# Patient Record
Sex: FEMALE | Race: WHITE | NOT HISPANIC OR LATINO | Employment: FULL TIME | URBAN - METROPOLITAN AREA
[De-identification: names, ages, dates, MRNs, and addresses within clinical notes are randomized per-mention and may not be internally consistent; named-entity substitution may affect disease eponyms.]

---

## 2023-09-11 ENCOUNTER — HOSPITAL ENCOUNTER (EMERGENCY)
Facility: CLINIC | Age: 33
Discharge: HOME | End: 2023-09-11
Payer: COMMERCIAL

## 2023-09-11 ENCOUNTER — HOSPITAL ENCOUNTER (OUTPATIENT)
Facility: CLINIC | Age: 33
Setting detail: OBSERVATION
Discharge: HOME | End: 2023-09-13
Attending: EMERGENCY MEDICINE | Admitting: RADIOLOGY
Payer: COMMERCIAL

## 2023-09-11 DIAGNOSIS — Z86.69 HISTORY OF DIPLOPIA: ICD-10-CM

## 2023-09-11 DIAGNOSIS — R51.9 ACUTE INTRACTABLE HEADACHE, UNSPECIFIED HEADACHE TYPE: ICD-10-CM

## 2023-09-11 DIAGNOSIS — G93.2 INTRACRANIAL HYPERTENSION: ICD-10-CM

## 2023-09-11 DIAGNOSIS — F32.A DEPRESSION, UNSPECIFIED DEPRESSION TYPE: Primary | ICD-10-CM

## 2023-09-11 PROCEDURE — 99285 EMERGENCY DEPT VISIT HI MDM: CPT | Mod: 25

## 2023-09-11 PROCEDURE — 96375 TX/PRO/DX INJ NEW DRUG ADDON: CPT

## 2023-09-11 PROCEDURE — 250N000011 HC RX IP 250 OP 636: Performed by: EMERGENCY MEDICINE

## 2023-09-11 PROCEDURE — 96374 THER/PROPH/DIAG INJ IV PUSH: CPT

## 2023-09-11 RX ORDER — DEXAMETHASONE SODIUM PHOSPHATE 10 MG/ML
10 INJECTION, SOLUTION INTRAMUSCULAR; INTRAVENOUS ONCE
Status: COMPLETED | OUTPATIENT
Start: 2023-09-11 | End: 2023-09-11

## 2023-09-11 RX ORDER — LORAZEPAM 2 MG/ML
0.5 INJECTION INTRAMUSCULAR ONCE
Status: COMPLETED | OUTPATIENT
Start: 2023-09-11 | End: 2023-09-12

## 2023-09-11 RX ORDER — HYDROMORPHONE HYDROCHLORIDE 1 MG/ML
0.5 INJECTION, SOLUTION INTRAMUSCULAR; INTRAVENOUS; SUBCUTANEOUS ONCE
Status: COMPLETED | OUTPATIENT
Start: 2023-09-11 | End: 2023-09-11

## 2023-09-11 RX ADMIN — DEXAMETHASONE SODIUM PHOSPHATE 10 MG: 10 INJECTION, SOLUTION INTRAMUSCULAR; INTRAVENOUS at 22:05

## 2023-09-11 RX ADMIN — HYDROMORPHONE HYDROCHLORIDE 0.5 MG: 1 INJECTION, SOLUTION INTRAMUSCULAR; INTRAVENOUS; SUBCUTANEOUS at 21:57

## 2023-09-11 RX ADMIN — MIDAZOLAM 2 MG: 1 INJECTION INTRAMUSCULAR; INTRAVENOUS at 23:44

## 2023-09-11 ASSESSMENT — ACTIVITIES OF DAILY LIVING (ADL)
ADLS_ACUITY_SCORE: 35
ADLS_ACUITY_SCORE: 35

## 2023-09-12 ENCOUNTER — APPOINTMENT (OUTPATIENT)
Dept: MRI IMAGING | Facility: CLINIC | Age: 33
End: 2023-09-12
Attending: EMERGENCY MEDICINE
Payer: COMMERCIAL

## 2023-09-12 PROBLEM — G93.2 INTRACRANIAL HYPERTENSION: Status: ACTIVE | Noted: 2023-09-12

## 2023-09-12 PROBLEM — Z86.69 HISTORY OF DIPLOPIA: Status: ACTIVE | Noted: 2023-09-12

## 2023-09-12 PROBLEM — R51.9 ACUTE INTRACTABLE HEADACHE, UNSPECIFIED HEADACHE TYPE: Status: ACTIVE | Noted: 2023-09-12

## 2023-09-12 PROCEDURE — 99222 1ST HOSP IP/OBS MODERATE 55: CPT | Performed by: INTERNAL MEDICINE

## 2023-09-12 PROCEDURE — 250N000011 HC RX IP 250 OP 636: Performed by: EMERGENCY MEDICINE

## 2023-09-12 PROCEDURE — 96376 TX/PRO/DX INJ SAME DRUG ADON: CPT | Mod: XU

## 2023-09-12 PROCEDURE — 96375 TX/PRO/DX INJ NEW DRUG ADDON: CPT

## 2023-09-12 PROCEDURE — 250N000013 HC RX MED GY IP 250 OP 250 PS 637: Performed by: INTERNAL MEDICINE

## 2023-09-12 PROCEDURE — 999N000128 HC STATISTIC PERIPHERAL IV START W/O US GUIDANCE

## 2023-09-12 PROCEDURE — 258N000001 HC RX 258: Performed by: HOSPITALIST

## 2023-09-12 PROCEDURE — 70546 MR ANGIOGRAPH HEAD W/O&W/DYE: CPT

## 2023-09-12 PROCEDURE — 96376 TX/PRO/DX INJ SAME DRUG ADON: CPT

## 2023-09-12 PROCEDURE — G0378 HOSPITAL OBSERVATION PER HR: HCPCS

## 2023-09-12 PROCEDURE — 258N000003 HC RX IP 258 OP 636: Performed by: INTERNAL MEDICINE

## 2023-09-12 PROCEDURE — 250N000011 HC RX IP 250 OP 636: Performed by: INTERNAL MEDICINE

## 2023-09-12 PROCEDURE — 250N000011 HC RX IP 250 OP 636: Performed by: HOSPITALIST

## 2023-09-12 PROCEDURE — 70553 MRI BRAIN STEM W/O & W/DYE: CPT

## 2023-09-12 PROCEDURE — 255N000002 HC RX 255 OP 636: Performed by: EMERGENCY MEDICINE

## 2023-09-12 PROCEDURE — 250N000013 HC RX MED GY IP 250 OP 250 PS 637: Performed by: HOSPITALIST

## 2023-09-12 PROCEDURE — A9585 GADOBUTROL INJECTION: HCPCS | Performed by: EMERGENCY MEDICINE

## 2023-09-12 PROCEDURE — 99207 PR APP CREDIT; MD BILLING SHARED VISIT: CPT | Performed by: HOSPITALIST

## 2023-09-12 RX ORDER — SODIUM CHLORIDE 9 MG/ML
INJECTION, SOLUTION INTRAVENOUS CONTINUOUS
Status: DISCONTINUED | OUTPATIENT
Start: 2023-09-12 | End: 2023-09-12

## 2023-09-12 RX ORDER — NALOXONE HYDROCHLORIDE 0.4 MG/ML
0.4 INJECTION, SOLUTION INTRAMUSCULAR; INTRAVENOUS; SUBCUTANEOUS
Status: DISCONTINUED | OUTPATIENT
Start: 2023-09-12 | End: 2023-09-13 | Stop reason: HOSPADM

## 2023-09-12 RX ORDER — PROCHLORPERAZINE 25 MG
25 SUPPOSITORY, RECTAL RECTAL EVERY 12 HOURS PRN
Status: DISCONTINUED | OUTPATIENT
Start: 2023-09-12 | End: 2023-09-13 | Stop reason: HOSPADM

## 2023-09-12 RX ORDER — KETOROLAC TROMETHAMINE 15 MG/ML
30 INJECTION, SOLUTION INTRAMUSCULAR; INTRAVENOUS EVERY 6 HOURS PRN
Status: DISCONTINUED | OUTPATIENT
Start: 2023-09-12 | End: 2023-09-13 | Stop reason: HOSPADM

## 2023-09-12 RX ORDER — ACETAMINOPHEN 325 MG/1
650 TABLET ORAL EVERY 6 HOURS PRN
Status: DISCONTINUED | OUTPATIENT
Start: 2023-09-12 | End: 2023-09-13 | Stop reason: HOSPADM

## 2023-09-12 RX ORDER — NALOXONE HYDROCHLORIDE 0.4 MG/ML
0.2 INJECTION, SOLUTION INTRAMUSCULAR; INTRAVENOUS; SUBCUTANEOUS
Status: DISCONTINUED | OUTPATIENT
Start: 2023-09-12 | End: 2023-09-13 | Stop reason: HOSPADM

## 2023-09-12 RX ORDER — IBUPROFEN 200 MG
200 TABLET ORAL EVERY 4 HOURS PRN
COMMUNITY

## 2023-09-12 RX ORDER — AMOXICILLIN 250 MG
2 CAPSULE ORAL 2 TIMES DAILY PRN
Status: DISCONTINUED | OUTPATIENT
Start: 2023-09-12 | End: 2023-09-13 | Stop reason: HOSPADM

## 2023-09-12 RX ORDER — PROCHLORPERAZINE MALEATE 10 MG
10 TABLET ORAL EVERY 6 HOURS PRN
Status: DISCONTINUED | OUTPATIENT
Start: 2023-09-12 | End: 2023-09-13 | Stop reason: HOSPADM

## 2023-09-12 RX ORDER — ONDANSETRON 4 MG/1
4 TABLET, ORALLY DISINTEGRATING ORAL EVERY 6 HOURS PRN
Status: DISCONTINUED | OUTPATIENT
Start: 2023-09-12 | End: 2023-09-13 | Stop reason: HOSPADM

## 2023-09-12 RX ORDER — DIAZEPAM 10 MG/2ML
5 INJECTION, SOLUTION INTRAMUSCULAR; INTRAVENOUS EVERY 6 HOURS PRN
Status: DISCONTINUED | OUTPATIENT
Start: 2023-09-12 | End: 2023-09-13 | Stop reason: HOSPADM

## 2023-09-12 RX ORDER — KETOROLAC TROMETHAMINE 15 MG/ML
30 INJECTION, SOLUTION INTRAMUSCULAR; INTRAVENOUS ONCE
Status: COMPLETED | OUTPATIENT
Start: 2023-09-12 | End: 2023-09-12

## 2023-09-12 RX ORDER — HYDROMORPHONE HYDROCHLORIDE 1 MG/ML
0.5 INJECTION, SOLUTION INTRAMUSCULAR; INTRAVENOUS; SUBCUTANEOUS
Status: DISCONTINUED | OUTPATIENT
Start: 2023-09-12 | End: 2023-09-13 | Stop reason: HOSPADM

## 2023-09-12 RX ORDER — GADOBUTROL 604.72 MG/ML
10 INJECTION INTRAVENOUS ONCE
Status: COMPLETED | OUTPATIENT
Start: 2023-09-12 | End: 2023-09-12

## 2023-09-12 RX ORDER — LORAZEPAM 2 MG/ML
0.5 INJECTION INTRAMUSCULAR ONCE
Status: COMPLETED | OUTPATIENT
Start: 2023-09-12 | End: 2023-09-12

## 2023-09-12 RX ORDER — OXYCODONE HYDROCHLORIDE 5 MG/1
5 TABLET ORAL EVERY 4 HOURS PRN
Status: DISCONTINUED | OUTPATIENT
Start: 2023-09-12 | End: 2023-09-13 | Stop reason: HOSPADM

## 2023-09-12 RX ORDER — ONDANSETRON 2 MG/ML
4 INJECTION INTRAMUSCULAR; INTRAVENOUS ONCE
Status: DISCONTINUED | OUTPATIENT
Start: 2023-09-12 | End: 2023-09-13 | Stop reason: HOSPADM

## 2023-09-12 RX ORDER — LIDOCAINE 40 MG/G
CREAM TOPICAL
Status: DISCONTINUED | OUTPATIENT
Start: 2023-09-12 | End: 2023-09-13 | Stop reason: HOSPADM

## 2023-09-12 RX ORDER — FLUOXETINE 10 MG/1
10 CAPSULE ORAL DAILY
Status: DISCONTINUED | OUTPATIENT
Start: 2023-09-12 | End: 2023-09-13 | Stop reason: HOSPADM

## 2023-09-12 RX ORDER — ACETAMINOPHEN 650 MG/1
650 SUPPOSITORY RECTAL EVERY 6 HOURS PRN
Status: DISCONTINUED | OUTPATIENT
Start: 2023-09-12 | End: 2023-09-13 | Stop reason: HOSPADM

## 2023-09-12 RX ORDER — ELETRIPTAN HYDROBROMIDE 20 MG/1
20 TABLET, FILM COATED ORAL
Status: COMPLETED | OUTPATIENT
Start: 2023-09-12 | End: 2023-09-12

## 2023-09-12 RX ORDER — ONDANSETRON 2 MG/ML
4 INJECTION INTRAMUSCULAR; INTRAVENOUS EVERY 6 HOURS PRN
Status: DISCONTINUED | OUTPATIENT
Start: 2023-09-12 | End: 2023-09-13 | Stop reason: HOSPADM

## 2023-09-12 RX ORDER — ACETAMINOPHEN 325 MG/1
325-650 TABLET ORAL EVERY 6 HOURS PRN
COMMUNITY

## 2023-09-12 RX ORDER — ELETRIPTAN HYDROBROMIDE 20 MG/1
20 TABLET, FILM COATED ORAL
Status: DISCONTINUED | OUTPATIENT
Start: 2023-09-12 | End: 2023-09-12

## 2023-09-12 RX ORDER — ELETRIPTAN HYDROBROMIDE 20 MG/1
20 TABLET, FILM COATED ORAL ONCE
Status: COMPLETED | OUTPATIENT
Start: 2023-09-12 | End: 2023-09-12

## 2023-09-12 RX ORDER — METOCLOPRAMIDE HYDROCHLORIDE 5 MG/ML
10 INJECTION INTRAMUSCULAR; INTRAVENOUS EVERY 6 HOURS PRN
Status: DISCONTINUED | OUTPATIENT
Start: 2023-09-12 | End: 2023-09-13 | Stop reason: HOSPADM

## 2023-09-12 RX ORDER — DEXTROSE MONOHYDRATE, SODIUM CHLORIDE, AND POTASSIUM CHLORIDE 50; 1.49; 9 G/1000ML; G/1000ML; G/1000ML
INJECTION, SOLUTION INTRAVENOUS CONTINUOUS
Status: DISCONTINUED | OUTPATIENT
Start: 2023-09-12 | End: 2023-09-13 | Stop reason: HOSPADM

## 2023-09-12 RX ORDER — AMOXICILLIN 250 MG
1 CAPSULE ORAL 2 TIMES DAILY PRN
Status: DISCONTINUED | OUTPATIENT
Start: 2023-09-12 | End: 2023-09-13 | Stop reason: HOSPADM

## 2023-09-12 RX ADMIN — GADOBUTROL 10 ML: 604.72 INJECTION INTRAVENOUS at 01:07

## 2023-09-12 RX ADMIN — ONDANSETRON 4 MG: 2 INJECTION INTRAMUSCULAR; INTRAVENOUS at 11:33

## 2023-09-12 RX ADMIN — HYDROMORPHONE HYDROCHLORIDE 0.5 MG: 1 INJECTION, SOLUTION INTRAMUSCULAR; INTRAVENOUS; SUBCUTANEOUS at 16:06

## 2023-09-12 RX ADMIN — KETOROLAC TROMETHAMINE 30 MG: 15 INJECTION, SOLUTION INTRAMUSCULAR; INTRAVENOUS at 04:02

## 2023-09-12 RX ADMIN — OXYCODONE HYDROCHLORIDE 5 MG: 5 TABLET ORAL at 22:09

## 2023-09-12 RX ADMIN — ONDANSETRON 4 MG: 2 INJECTION INTRAMUSCULAR; INTRAVENOUS at 19:50

## 2023-09-12 RX ADMIN — HYDROMORPHONE HYDROCHLORIDE 0.5 MG: 1 INJECTION, SOLUTION INTRAMUSCULAR; INTRAVENOUS; SUBCUTANEOUS at 09:29

## 2023-09-12 RX ADMIN — POTASSIUM CHLORIDE, DEXTROSE MONOHYDRATE AND SODIUM CHLORIDE: 150; 5; 900 INJECTION, SOLUTION INTRAVENOUS at 18:22

## 2023-09-12 RX ADMIN — HYDROMORPHONE HYDROCHLORIDE 0.5 MG: 1 INJECTION, SOLUTION INTRAMUSCULAR; INTRAVENOUS; SUBCUTANEOUS at 04:02

## 2023-09-12 RX ADMIN — LORAZEPAM 0.5 MG: 2 INJECTION INTRAMUSCULAR; INTRAVENOUS at 03:26

## 2023-09-12 RX ADMIN — SODIUM CHLORIDE: 9 INJECTION, SOLUTION INTRAVENOUS at 09:11

## 2023-09-12 RX ADMIN — ELETRIPTAN HYDROBROMIDE 20 MG: 40 TABLET, FILM COATED ORAL at 23:04

## 2023-09-12 RX ADMIN — DIAZEPAM 5 MG: 10 INJECTION, SOLUTION INTRAMUSCULAR; INTRAVENOUS at 23:09

## 2023-09-12 RX ADMIN — ELETRIPTAN HYDROBROMIDE 20 MG: 40 TABLET, FILM COATED ORAL at 19:50

## 2023-09-12 RX ADMIN — FLUOXETINE 10 MG: 10 CAPSULE ORAL at 20:03

## 2023-09-12 RX ADMIN — KETOROLAC TROMETHAMINE 30 MG: 15 INJECTION, SOLUTION INTRAMUSCULAR; INTRAVENOUS at 12:43

## 2023-09-12 RX ADMIN — DIAZEPAM 5 MG: 10 INJECTION, SOLUTION INTRAMUSCULAR; INTRAVENOUS at 17:12

## 2023-09-12 ASSESSMENT — ACTIVITIES OF DAILY LIVING (ADL)
ADLS_ACUITY_SCORE: 35

## 2023-09-12 NOTE — ED PROVIDER NOTES
History     Chief Complaint:  Headache     HPI   Patricia Clement is a 33 year old female with a history of migraines who presents to the ED for an evaluation of a headache. Patricia went to the [facility]rg[facility] room for an evaluation and was sent to the ED after CT showed possible intracranial hypertension. The patient reports she did her daily routine on Saturday, going to the gym and then doing a 6 hr shift at [employer], and when she came home, she started having an acute headache. Patricia took 800 mg of Ibuprofen hoping her headache would stop. States that when she woke up Sunday morning, she started having intense neck pain and could hardly move. She reports worsening of her headache with changes in vision, light headiness, and nausea. She states that yesterday she tried taking some tylenol and ibuprofen, took melatonin before sleeping and rubbed her face with an ice pack. Patricia could hardly sleep, her head kept pounding from right behind eyes, threw up sometime in the middle of the night. Monday morning, she layed in bed to a point where her headache was getting worse. She's had migraines in the past but never lasted this long. Last time she took ibuprofen was at 1 pm today. States it's uncommon for her headaches to go this long. Denies confusion and falls.      Radiographic and laboratory studies obtained at the [place] urgency room:    Sodium value of 139  Potassium value of 4.2  Glucose Random value of 88  Creatinine 0.51    White blood count value of 6.8  Red blood count value of 4.43  Hemoglobin value of 14.5  Platelet count value of 190    Head CT: 1. No acute intercranial abnormality. 2.  Empty sella morphology incidentally noted.  In the majority of the patients, this is an asymptomatic incidental finding; however, in some patients this can be associated with idiopathic intracranial hypertension.  If clinical concern for intracranial hypertension exists, correlation with ophthalmologic funduscopic exam for  "papilledema is advised.      Independent Historian:   The patient and her family members provide some of the history    Review of External Notes:   I reviewed the notes from the emergency room in [place] that were faxed to us because they are not available on care everywhere    Medications:    fluoxetine   dextroamphetamine-amphetamine    Past Medical History:   Anxiety disorder  Depression      Past Surgical History:    Shoulder surgery   Tonsil and adenoidectomy     Physical Exam   Patient Vitals for the past 24 hrs:   BP Temp Temp src Pulse Resp SpO2 Height Weight   09/12/23 0237 113/81 -- -- 78 16 97 % -- --   09/11/23 2359 -- -- -- -- -- 95 % -- --   09/11/23 2300 106/71 -- -- -- -- 97 % -- --   09/11/23 2045 (!) 148/93 99.1  F (37.3  C) Oral 83 -- 99 % 1.676 m (5' 6\") 61.2 kg (135 lb)        Physical Exam  General: Alert, No distress. Nontoxic appearance  Head: No signs of trauma.   Mouth/Throat: Oropharynx moist.   Eyes: Conjunctivae are normal. Pupils are equal.  Nondilated exam of the fundus shows bilateral prominent optic disc.    Neck: Normal range of motion.    CV: Appears well perfused.  Regular rate and rhythm  Resp:No respiratory distress.   MSK: Normal range of motion. No obvious deformity.   Neuro: The patient is alert and interactive. BUCHANAN. Speech normal. GCS 15.  Her face is symmetric.  Skin: No lesion or sign of trauma noted.   Psych: normal mood and affect. behavior is normal.          Emergency Department Course     Imaging:  MR Brain w/o & w Contrast   Final Result   IMPRESSION:   HEAD MRI:    1.  Normal pituitary MRI.       HEAD MRV:   1.  No dural venous sinus thrombosis or significant stenosis.      MRV Brain wo & w Contrast   Final Result   IMPRESSION:   HEAD MRI:    1.  Normal pituitary MRI.       HEAD MRV:   1.  No dural venous sinus thrombosis or significant stenosis.           Laboratory:  The labs done at the urgency room in [place]    Emergency Department Course & " Assessments:  Interventions:  Medications   LORazepam (ATIVAN) injection 0.5 mg (has no administration in time range)   HYDROmorphone (PF) (DILAUDID) injection 0.5 mg (has no administration in time range)   ondansetron (ZOFRAN) injection 4 mg (has no administration in time range)   dexAMETHasone PF (DECADRON) injection 10 mg (10 mg Intravenous $Given 9/11/23 2205)   HYDROmorphone (PF) (DILAUDID) injection 0.5 mg (0.5 mg Intravenous $Given 9/11/23 2157)   midazolam (VERSED) injection 2 mg (2 mg Intravenous $Given 9/11/23 2344)   gadobutrol (GADAVIST) injection 10 mL (10 mLs Intravenous $Given 9/12/23 0107)   sodium chloride (PF) 0.9% PF flush 100 mL (100 mLs Intravenous $Given 9/12/23 0108)        Assessments:  2133 I obtained history and examined the patient as noted above.     Independent Interpretation (X-rays, CTs, rhythm strip):  MRI shows no evidence of infarct    Consultations/Discussion of Management or Tests:  I spoke with Dr. William Gonzalez of stroke neurology.    I spoke with Dr. Aiken, the admitting hospitalist.    Social Determinants of Health affecting care:   Stress/Adjustment Disorders    Disposition:  The patient was admitted to the hospital under the care of Dr. Aiken.     Impression & Plan      Medical Decision Making:  This patient is presenting to the ER with an intractable headache.  CT was obtained at the outside facility.  That CT showed concern for possible signs of increased intracranial pressure.  When the patient arrived in the ED she was still having severe headache pain.  She was treated at of as above and improved slightly.  Patient underwent MRI MRV after consultation with stroke neurology.  Those studies were negative.  Given the patient's continued headache and need for further evaluation.  The patient will be admitted to the hospital as recommended by stroke neurology for general neurology consultation.    Diagnosis:    ICD-10-CM    1. Acute intractable headache,  unspecified headache type  R51.9       2. Intracranial hypertension  G93.2       3. History of diplopia  Z86.69              Scribe Disclosure:  I, Deisy Shahid, am serving as a scribe at 9:16 PM on 9/11/2023 to document services personally performed by Kelechi Guzmán MD based on my observations and the provider's statements to me.     9/11/2023   Kelechi Guzmán MD Joing, Todd Roger, MD  09/12/23 0249

## 2023-09-12 NOTE — UTILIZATION REVIEW
"Admission Status; Secondary Review Determination     Under the authority of the Utilization Management Committee, the utilization review process indicated a secondary review on the above patient.  The review outcome is based on review of the medical records, discussions with staff, and applying clinical experience noted on the date of the review.          (x) Observation Status Appropriate - This patient does not meet hospital inpatient criteria and is placed in observation status. If this patient's primary payer is Medicare and was admitted as an inpatient, Condition Code 44 should be used and patient status changed to \"observation\".     RATIONALE FOR DETERMINATION   32 yo female with migraine history who presented with severe, intractable headache.  Not alleviated with ibuprofen prior to admission.  Vital signs stable.  Labs done at urgency room prior to referral were normal unremarkable, and has not had repeat labs here.  Brain MRI/MRV normal.  She has gotten some IV fluids and opiate pain medications.  Neurology consultation is pending.  There was some mention of possible intracranial hypertension, but unable to confirm this.    The severity of illness, intensity of service provided, expected LOS and risk for adverse outcome make the care appropriate for further observation; however, doesn't meet criteria for hospital inpatient admission. Dr. Martinez notified of this determination.    However, if after neurology consult there are any unexpected developments or interventions needed, could reconsider for inpatient status.    This document was produced using voice recognition software.      The information on this document is developed by the utilization review team in order for the business office to ensure compliance.  This only denotes the appropriateness of proper admission status and does not reflect the quality of care rendered.         The definitions of Inpatient Status and Observation Status used in making " the determination above are those provided in the CMS Coverage Manual, Chapter 1 and Chapter 6, section 70.4.      Sincerely,  Clementine Falcon MD  Utilization Review  Physician Advisor  [facility].

## 2023-09-12 NOTE — H&P
[facility]    History and Physical - Hospitalist Service       Date of Admission:  9/11/2023    Assessment & Plan      Patricia Clement is a 33 year old female admitted on 9/11/2023. 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 Took ibuprofen. Sunday woke with severe neck pain and could hardly move. Had changes in vision, lightheadedness and nausea. Head pounding behind eyes. Some photophobia, no other neuro changes. In ED VSS. Labs (at [facility]) normal. MRI/ MRV normal.   - IV fluids  - q4 neuro checks  - neurology consult  - prn analgesics, antiemetics    MDD  -  not currently on meds    Celiac disease  No acute issues     Diet:  regular  DVT Prophylaxis: Ambulate every shift  Alvarez Catheter: Not present  Lines: None     Cardiac Monitoring: None  Code Status:  Full    Clinically Significant Risk Factors Present on Admission                                  Disposition Plan      Expected Discharge Date: 09/14/2023                  Boogie Aiken MD  Hospitalist Service  [facility]  Securely message with [employer] (more info)  Text page via [facility] Paging/Directory     ______________________________________________________________________    Chief Complaint   headache    History is obtained from the patient, electronic health record, and emergency department physician    History of Present Illness   Patricia Clement is a 33 year old female who presents with headache.  She has a history of migraines.  She notes that 3 days ago she was in her usual state of health when in the evening she developed a headache.  She describes the headache as pounding behind her eyes and in a crown around her head.  She took some ibuprofen and Tylenol.  The next day she woke up with a headache persisting.  She also noted that she started to have some limited  range of motion with her neck.  She could not sleep Monday the headache persisted she had photophobia.  She also had nausea vomiting.  She also noted intermittent ringing in her ear.  She has had some photophobia.  She has double vision from the last year or so and wears glasses with prisms.  She states that headache is similar in character to the migraine but has never been this severe and lasted as long.  She has not had fevers but may be noted some chills.  No chest pain or shortness of breath.  She has had nausea and did vomit on Sunday.  She has no other neurologic symptoms.      Past Medical History    MDD    Past Surgical History   No past surgical history on file.    Prior to Admission Medications   None        Review of Systems    The 10 point Review of Systems is negative other than noted in the HPI or here.     Social History   I have reviewed this patient's social history and updated it with pertinent information if needed.    Social Etoh, no tobacco    Physical Exam   Vital Signs: Temp: 99.1  F (37.3  C) Temp src: Oral BP: 106/71 Pulse: 83     SpO2: 95 % O2 Device: None (Room air)    Weight: 135 lbs 0 oz    General Appearance: Alert, distress from headache  Respiratory: CTA B  Cardiovascular: RRR, no murmur. No edema  GI: soft, nt/nd  Skin: no rashes or lesions grossly    Other: CN grossly intact, BUCHANAN      Medical Decision Making       60 MINUTES SPENT BY ME on the date of service doing chart review, history, exam, documentation & further activities per the note.      Data         Imaging results reviewed over the past 24 hrs:   Recent Results (from the past 24 hour(s))   MRV Brain wo & w Contrast    Narrative    EXAM: MR BRAIN W/O and W CONTRAST, MRV BRAIN  W/O and W CONTRAST  LOCATION: [facility]  DATE/TIME: 9/12/2023 1:10 AM CDT    INDICATION:  Headache  COMPARISON: None.  CONTRAST: 10 mL Gadavist  TECHNIQUE:  1) Multiplanar multisequence head MRI without and with  intravenous contrast including dedicated imaging of the sella.  2) Phase contrast and 2-D time-of-flight head MRV performed with intravenous contrast.    FINDINGS:  BRAIN:  SELLA: Dedicated imaging of the sella demonstrates normal size, signal and enhancement characteristics of the pituitary gland.  No pituitary mass or hemorrhage. Normal pituitary stalk and suprasellar structures including the optic chiasm. Normal   cavernous sinuses.    INTRACRANIAL CONTENTS: No acute or subacute infarct. No mass, acute hemorrhage, or extra-axial fluid collections. Normal brain parenchymal signal. Normal ventricles and sulci. Normal position of the cerebellar tonsils. No pathologic contrast enhancement.    OTHER: Accounting for technique no additional abnormalities identified.    MRV:  DURAL SINUSES: No significant stenosis or occlusion. Dominant right and smaller left transverse and sigmoid dural sinuses.    INTERNAL CEREBRAL VEINS: No significant stenosis or occlusion.      MAJOR CORTICAL VENOUS BRANCHES: No significant stenosis or occlusion.      Impression    IMPRESSION:  HEAD MRI:   1.  Normal pituitary MRI.     HEAD MRV:  1.  No dural venous sinus thrombosis or significant stenosis.   MR Brain w/o & w Contrast    Narrative    EXAM: MR BRAIN W/O and W CONTRAST, MRV BRAIN  W/O and W CONTRAST  LOCATION: [facility]  DATE/TIME: 9/12/2023 1:10 AM CDT    INDICATION:  Headache  COMPARISON: None.  CONTRAST: 10 mL Gadavist  TECHNIQUE:  1) Multiplanar multisequence head MRI without and with intravenous contrast including dedicated imaging of the sella.  2) Phase contrast and 2-D time-of-flight head MRV performed with intravenous contrast.    FINDINGS:  BRAIN:  SELLA: Dedicated imaging of the sella demonstrates normal size, signal and enhancement characteristics of the pituitary gland.  No pituitary mass or hemorrhage. Normal pituitary stalk and suprasellar structures including the optic chiasm. Normal    cavernous sinuses.    INTRACRANIAL CONTENTS: No acute or subacute infarct. No mass, acute hemorrhage, or extra-axial fluid collections. Normal brain parenchymal signal. Normal ventricles and sulci. Normal position of the cerebellar tonsils. No pathologic contrast enhancement.    OTHER: Accounting for technique no additional abnormalities identified.    MRV:  DURAL SINUSES: No significant stenosis or occlusion. Dominant right and smaller left transverse and sigmoid dural sinuses.    INTERNAL CEREBRAL VEINS: No significant stenosis or occlusion.      MAJOR CORTICAL VENOUS BRANCHES: No significant stenosis or occlusion.      Impression    IMPRESSION:  HEAD MRI:   1.  Normal pituitary MRI.     HEAD MRV:  1.  No dural venous sinus thrombosis or significant stenosis.

## 2023-09-12 NOTE — PROGRESS NOTES
[facility]    Medicine Progress Note - Hospitalist Service    Date of Admission:  9/11/2023    Assessment & Plan   Patricia Clement is a 33 year old female admitted on 9/11/2023. 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 Took ibuprofen. Sunday woke with severe neck pain and could hardly move. Had changes in vision, lightheadedness and nausea. Head pounding behind eyes. Some photophobia, no other neuro changes. In ED VSS. Labs (at [facility]) normal. MRI/ MRV normal.   - IV fluids, change to d5/NS/20meqKCl given poor intake  - q8 neuro checks  - neurology appreciated, recommends LP with opening pressure  - relpax x1, can repeat after 2 hours if symptoms ongoing  - PRN valium, reglan, toradol, dilaudid, compazine, zofran available.  - will need neuro-ophthalmologist     MDD  -  resume prozac 10mg (she had weaned herself off previously)     Celiac disease  No acute issues, gluten free diet ordered       Diet: Gluten Free Diet    DVT Prophylaxis: Low Risk/Ambulatory with no VTE prophylaxis indicated  Alvarez Catheter: Not present  Lines: None     Cardiac Monitoring: None  Code Status: Full Code      Clinically Significant Risk Factors Present on Admission                                  Disposition Plan      Expected Discharge Date: 09/13/2023                  Denise Martinez DO  Hospitalist Service  [facility]  Securely message with [employer] (more info)  Text page via [employer] Paging/Directory   ______________________________________________________________________    Interval History   Patient seen and examined. No chest pain, shortness of breath. She has been having nausea, had some vomiting earlier. Having a lot of light sensitivity. Vision is blurry, feels like her eyes are being pushed out of her head and she feels her vision is even more  strained than normal. Was able to tolerate some smoothie. Most meds via IV give her some minor relief of symptoms, but each time when given she experiences nausea (no matter which med administered via IV). Discussed with neurology and RN.    Physical Exam   Vital Signs: Temp: 98.3  F (36.8  C) Temp src: Oral BP: 111/69 Pulse: 81   Resp: 14 SpO2: 99 % O2 Device: None (Room air)    Weight: 135 lbs 0 oz    Constitutional: Awake, alert, cooperative, no apparent distress  Respiratory: Clear to auscultation bilaterally, no crackles or wheezing  Cardiovascular: Regular rate and rhythm, normal S1 and S2, and no murmur noted  GI: Normal bowel sounds, soft, non-distended, non-tender  Skin/Integumen: No rashes, no cyanosis, no edema  Other:      Medical Decision Making       35 MINUTES SPENT BY ME on the date of service doing chart review, history, exam, documentation & further activities per the note.      Data         Imaging results reviewed over the past 24 hrs:   Recent Results (from the past 24 hour(s))   MRV Brain wo & w Contrast    Narrative    EXAM: MR BRAIN W/O and W CONTRAST, MRV BRAIN  W/O and W CONTRAST  LOCATION: [facility]  DATE/TIME: 9/12/2023 1:10 AM CDT    INDICATION:  Headache  COMPARISON: None.  CONTRAST: 10 mL Gadavist  TECHNIQUE:  1) Multiplanar multisequence head MRI without and with intravenous contrast including dedicated imaging of the sella.  2) Phase contrast and 2-D time-of-flight head MRV performed with intravenous contrast.    FINDINGS:  BRAIN:  SELLA: Dedicated imaging of the sella demonstrates normal size, signal and enhancement characteristics of the pituitary gland.  No pituitary mass or hemorrhage. Normal pituitary stalk and suprasellar structures including the optic chiasm. Normal   cavernous sinuses.    INTRACRANIAL CONTENTS: No acute or subacute infarct. No mass, acute hemorrhage, or extra-axial fluid collections. Normal brain parenchymal signal. Normal  ventricles and sulci. Normal position of the cerebellar tonsils. No pathologic contrast enhancement.    OTHER: Accounting for technique no additional abnormalities identified.    MRV:  DURAL SINUSES: No significant stenosis or occlusion. Dominant right and smaller left transverse and sigmoid dural sinuses.    INTERNAL CEREBRAL VEINS: No significant stenosis or occlusion.      MAJOR CORTICAL VENOUS BRANCHES: No significant stenosis or occlusion.      Impression    IMPRESSION:  HEAD MRI:   1.  Normal pituitary MRI.     HEAD MRV:  1.  No dural venous sinus thrombosis or significant stenosis.   MR Brain w/o & w Contrast    Narrative    EXAM: MR BRAIN W/O and W CONTRAST, MRV BRAIN  W/O and W CONTRAST  LOCATION: [facility]  DATE/TIME: 9/12/2023 1:10 AM CDT    INDICATION:  Headache  COMPARISON: None.  CONTRAST: 10 mL Gadavist  TECHNIQUE:  1) Multiplanar multisequence head MRI without and with intravenous contrast including dedicated imaging of the sella.  2) Phase contrast and 2-D time-of-flight head MRV performed with intravenous contrast.    FINDINGS:  BRAIN:  SELLA: Dedicated imaging of the sella demonstrates normal size, signal and enhancement characteristics of the pituitary gland.  No pituitary mass or hemorrhage. Normal pituitary stalk and suprasellar structures including the optic chiasm. Normal   cavernous sinuses.    INTRACRANIAL CONTENTS: No acute or subacute infarct. No mass, acute hemorrhage, or extra-axial fluid collections. Normal brain parenchymal signal. Normal ventricles and sulci. Normal position of the cerebellar tonsils. No pathologic contrast enhancement.    OTHER: Accounting for technique no additional abnormalities identified.    MRV:  DURAL SINUSES: No significant stenosis or occlusion. Dominant right and smaller left transverse and sigmoid dural sinuses.    INTERNAL CEREBRAL VEINS: No significant stenosis or occlusion.      MAJOR CORTICAL VENOUS BRANCHES: No significant  stenosis or occlusion.      Impression    IMPRESSION:  HEAD MRI:   1.  Normal pituitary MRI.     HEAD MRV:  1.  No dural venous sinus thrombosis or significant stenosis.

## 2023-09-12 NOTE — PHARMACY-ADMISSION MEDICATION HISTORY
Pharmacy Intern Admission Medication History    Admission medication history is complete. The information provided in this note is only as accurate as the sources available at the time of the update.    Medication reconciliation/reorder completed by provider prior to medication history? No    Information Source(s): Patient via in-person    Changes made to PTA medication list:  Added: acetaminophen prn, ibuprofen prn  Deleted: None  Changed: None    Medication Affordability: N/A, patient currently not taking any prescription meds       Allergies reviewed with patient and updates made in EHR: yes    Medication History Completed By: Agnes Rodriguez 9/12/2023 8:23 AM    Prior to Admission medications    Medication Sig Last Dose Taking? Auth Provider Long Term End Date   acetaminophen (TYLENOL) 325 MG tablet Take 325-650 mg by mouth every 6 hours as needed for mild pain  at prn Yes Unknown, Entered By History     ibuprofen (ADVIL/MOTRIN) 200 MG tablet Take 200 mg by mouth every 4 hours as needed for pain  at prn Yes Unknown, Entered By History

## 2023-09-12 NOTE — ED NOTES
Bed: ED13  Expected date:   Expected time:   Means of arrival:   Comments:  [facility] intercranial hypertension

## 2023-09-12 NOTE — ED NOTES
"[facility] [place]  ED Nurse Handoff Report    ED Chief complaint: Headache      ED Diagnosis:   Final diagnoses:   Acute intractable headache, unspecified headache type   Intracranial hypertension   History of diplopia       Code Status: Full Code    Allergies:   Allergies   Allergen Reactions    Sulfa Antibiotics Anaphylaxis       Patient Story: Patient presents with headache, neck pain, and nausea.  The headache started on Saturday and continued into Sunday.  Sunday, she developed neck pain and nausea.  She reports hx of migraines, but never this severe.  She was [facility] and had a head CT.  Head CT showed possible intercranial hypertension.  She was referred here for further workup.   Focused Assessment:  Headache and nausea continues here.  Symptoms improved slightly after IV medications.  No acute findings on MRI.    Treatments and/or interventions provided: IV decadron, IV dilaudid.  Patient's response to treatments and/or interventions: Mild improvement of symptoms.    To be done/followed up on inpatient unit:  Symptom management    Does this patient have any cognitive concerns?:  none    Activity level - Baseline/Home:  Independent  Activity Level - Current:   Independent    Patient's Preferred language: English   Needed?: No    Isolation: None  Infection: Not Applicable  Patient tested for COVID 19 prior to admission: NO  Bariatric?: No    Vital Signs:   Vitals:    09/11/23 2045 09/11/23 2300 09/11/23 2359 09/12/23 0237   BP: (!) 148/93 106/71  113/81   Pulse: 83   78   Resp:    16   Temp: 99.1  F (37.3  C)      TempSrc: Oral      SpO2: 99% 97% 95% 97%   Weight: 61.2 kg (135 lb)      Height: 1.676 m (5' 6\")          Cardiac Rhythm:     Was the PSS-3 completed:   Yes  What interventions are required if any?               Family Comments: none  OBS brochure/video discussed/provided to patient/family: No              Name of person given brochure if not patient: na          "     Relationship to patient: na    For the majority of the shift this patient's behavior was Green.   Behavioral interventions performed were none.    ED NURSE PHONE NUMBER: [phone]

## 2023-09-12 NOTE — CONSULTS
[facility]    Neurology Consultation     Date of Admission:  9/11/2023    Assessment & Plan   Patricia Clement is a 33 year old female who was admitted on 9/11/2023. I was asked to see the patient for intractable headaches.  Patient is a 33-year-old lady with history of migraine headaches who came with worsening persistent migraine headaches probable mixed with tension type as well.  Neurological exam today is normal with the exception of questionable left 6th nerve palsy.  MRI of the brain was normal, pituitary gland was normal no findings to suggest increased intracranial pressure.  Funduscopic exam was done I could see very well the blood vessels were normal however I could not see the disc very well unable to totally exclude papilledema.    - Might consider doing a lumbar puncture to check opening pressure and to exclude the remote possibility of meningitis.  - For the headaches I would recommend to try Relpax on an as-needed basis, will restart Prozac for now.  The patient has tried beta-blocker and had side effects from it, sumatriptan also caused side effects.  The patient does not want to try verapamil.    - If symptoms do not improve in the near future a sleep study might be useful to exclude the possibility of sleep apnea since the patient has a lot of headaches upon awakening.  -Upon discharge the patient is to follow-up with neuro-ophthalmologist.      Torie Marie MD    Code Status    Full Code    Reason for Consult   Reason for consult: I was asked by DR Aiken to evaluate this patient for intractable headaches..    Primary Care Physician   Madi Alexis    Chief Complaint   intractable headaches.    History is obtained from the patient and electronic health record    History of Present Illness   Patricia Clement is a 33 year old female who presents with headaches.  The patient developed a headache Saturday evening after work.  She tried ibuprofen  however next day she woke up with severe neck pain neck stiffness and the headache persisted.  She has had also lightheadedness and nausea.  The pain is still described as head pounding behind the eyes.  There is also pain around the head.  She has also associated nausea vomiting and photophobia.  Monday she could not sleep because of the headaches.  She has had no other neurological symptoms.  The patient has history of double vision which has been present for a year for which she has had prisms.    The patient has history of migraines.  These headaches have been similar to her migraines in characteristics however has been lasting longer and not easily treatable.  The patient states that when she was 24 or 25 years of age she started to have very frequent migraines almost every day.  At that time she was evaluated and beta-blocker was tried as well as a sumatriptan.  The patient remembers feeling sick from sumatriptan and dizzy.  She did not like the side effects of beta-blocker either.  At 1 time she was taking a lot of medication and felt dependent on them, eventually she stopped all her medication, she changed her birth control pill and the headaches got better.    The headaches have been manageable until a year ago when the patient moved from [facility] to [place].  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  Her job is quite stressful.  When she first started working here in [place] she has had about 3 or 4 months of very severe and frequent headaches however there were also milder headache which did not interfere with her working.  The headaches got better eventually until few days ago with a worsening migraine again.  The patient states that lately she has been doing shifts of 72 hours.  Her sleep has not been very good.  The patient states that she wakes up with a headache many times, at least 1 or twice a week.  She has had snoring on and off but not frequent.  Sometimes  she wakes up rested but most of the time not.  She has not gained weight.    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  The patient was given prisms which corrected the double vision.  The patient states that the double vision usually started later on during the day.  Now with the glasses she is seeing better however without glasses she is seems to be seeing worse.  She denies any recent visual acuity changes.    The patient has history of celiac disease, her brother is well.  Patient's mother has migraine which actually resolved after she has had a hysterectomy.  The patient has history of depression and she has been on and off on antidepressant.  The last time was 6 months ago when she was on Prozac.    The patient does not smoke does not drink.  She has a boyfriend.  She has no children.    On admission the patient has had an MRI of the head this is shows a normal pituitary MRI, normal MRI of the brain.  This showed  MRV shows no of the nose sinus thrombosis or other findings.      Past Medical History   I have reviewed this patient's medical history and updated it with pertinent information if needed.   MDD    Past Surgical History   Past surgical history reviewed with no previous surgeries identified.    Prior to Admission Medications   Prior to Admission Medications   Prescriptions Last Dose Informant Patient Reported? Taking?   acetaminophen (TYLENOL) 325 MG tablet  at prn  Yes Yes   Sig: Take 325-650 mg by mouth every 6 hours as needed for mild pain   ibuprofen (ADVIL/MOTRIN) 200 MG tablet  at prn  Yes Yes   Sig: Take 200 mg by mouth every 4 hours as needed for pain      Facility-Administered Medications: None     Allergies   Allergies   Allergen Reactions    Sulfa Antibiotics Anaphylaxis       Social History   I have reviewed this patient's social history and updated it with pertinent information if needed. Patricia Clement       Family History   I have reviewed this patient's family history and updated it with pertinent information if needed.   No family history on file.    Review of Systems   The 10 point Review of Systems is negative other than noted in the HPI or here.     Physical Exam   Temp: 98.1  F (36.7  C) Temp src: Oral BP: 122/82 Pulse: 96   Resp: 18 SpO2: 98 % O2 Device: None (Room air)    Vital Signs with Ranges  Temp:  [97.8  F (36.6  C)-99.1  F (37.3  C)] 98.1  F (36.7  C)  Pulse:  [69-96] 96  Resp:  [16-18] 18  BP: ()/(66-93) 122/82  SpO2:  [95 %-99 %] 98 %  135 lbs 0 oz    Constitutional: Normal  Eyes: No conjunctival erythema  ENT: Neck is supple  Respiratory: No shortness of breath or wheezing noticed   Skin: No obvious lesions  Musculoskeletal: No pedal edema  The patient is alert oriented x3 no acute distress.  Speech is fluent there is no aphasia apraxia or agnosia.  Attention and memory are normal.  Pupils are equal round reactive to light extraocular movements are intact.  The left eye is a slightly adducted, but movements are normal.  The patient has double vision when looking towards the left.  Facial muscles are equal bilaterally.  Uvula and tongue are midline.  Sternocleidomastoid and trapezius muscles are normal bilaterally.    Muscular mass tone and strength are normal in all 4 extremities there is no pronator drift.  Reflexes are present symmetric in upper and lower extremities.  Babinski is absent.    Sensory exam is normal to light touch and vibratory sense.    Finger-nose-finger heel-to-shin without dysmetria.  Fine movements are normal.    Gait was deferred  Neuropsychiatric: Appropriate    Data   Results for orders placed or performed during the hospital encounter of 09/11/23 (from the past 24 hour(s))   MRV Brain wo & w Contrast    Narrative    EXAM: MR BRAIN W/O and W CONTRAST, MRV BRAIN  W/O and W CONTRAST  LOCATION: [facility]  DATE/TIME: 9/12/2023 1:10 AM  CDT    INDICATION:  Headache  COMPARISON: None.  CONTRAST: 10 mL Gadavist  TECHNIQUE:  1) Multiplanar multisequence head MRI without and with intravenous contrast including dedicated imaging of the sella.  2) Phase contrast and 2-D time-of-flight head MRV performed with intravenous contrast.    FINDINGS:  BRAIN:  SELLA: Dedicated imaging of the sella demonstrates normal size, signal and enhancement characteristics of the pituitary gland.  No pituitary mass or hemorrhage. Normal pituitary stalk and suprasellar structures including the optic chiasm. Normal   cavernous sinuses.    INTRACRANIAL CONTENTS: No acute or subacute infarct. No mass, acute hemorrhage, or extra-axial fluid collections. Normal brain parenchymal signal. Normal ventricles and sulci. Normal position of the cerebellar tonsils. No pathologic contrast enhancement.    OTHER: Accounting for technique no additional abnormalities identified.    MRV:  DURAL SINUSES: No significant stenosis or occlusion. Dominant right and smaller left transverse and sigmoid dural sinuses.    INTERNAL CEREBRAL VEINS: No significant stenosis or occlusion.      MAJOR CORTICAL VENOUS BRANCHES: No significant stenosis or occlusion.      Impression    IMPRESSION:  HEAD MRI:   1.  Normal pituitary MRI.     HEAD MRV:  1.  No dural venous sinus thrombosis or significant stenosis.   MR Brain w/o & w Contrast    Narrative    EXAM: MR BRAIN W/O and W CONTRAST, MRV BRAIN  W/O and W CONTRAST  LOCATION: [facility]  DATE/TIME: 9/12/2023 1:10 AM CDT    INDICATION:  Headache  COMPARISON: None.  CONTRAST: 10 mL Gadavist  TECHNIQUE:  1) Multiplanar multisequence head MRI without and with intravenous contrast including dedicated imaging of the sella.  2) Phase contrast and 2-D time-of-flight head MRV performed with intravenous contrast.    FINDINGS:  BRAIN:  SELLA: Dedicated imaging of the sella demonstrates normal size, signal and enhancement characteristics of the  pituitary gland.  No pituitary mass or hemorrhage. Normal pituitary stalk and suprasellar structures including the optic chiasm. Normal   cavernous sinuses.    INTRACRANIAL CONTENTS: No acute or subacute infarct. No mass, acute hemorrhage, or extra-axial fluid collections. Normal brain parenchymal signal. Normal ventricles and sulci. Normal position of the cerebellar tonsils. No pathologic contrast enhancement.    OTHER: Accounting for technique no additional abnormalities identified.    MRV:  DURAL SINUSES: No significant stenosis or occlusion. Dominant right and smaller left transverse and sigmoid dural sinuses.    INTERNAL CEREBRAL VEINS: No significant stenosis or occlusion.      MAJOR CORTICAL VENOUS BRANCHES: No significant stenosis or occlusion.      Impression    IMPRESSION:  HEAD MRI:   1.  Normal pituitary MRI.     HEAD MRV:  1.  No dural venous sinus thrombosis or significant stenosis.

## 2023-09-12 NOTE — PROGRESS NOTES
RECEIVING UNIT ED HANDOFF REVIEW    ED Nurse Handoff Report was reviewed by: Roselia Harris RN on September 12, 2023 at 8:43 AM

## 2023-09-12 NOTE — PLAN OF CARE
Pt here with 3 day HA, nausea and vomiting. A&Ox4. Neuros intact except baseline blurred vision and doubled vision, HA. VSS. Regular diet, thin liquids. Takes pills whole. Up independently. Complaints of HA 7/10, gave IV Dilaudid x2 and IV Toradol x1. IV Zofran x1 given, IV Valium x1. Pt scoring green on the Aggression Stop Light Tool. Plan lumbar puncture tomorrow. Discharge pending.

## 2023-09-12 NOTE — ED NOTES
Patient with worsening migraine that started Saturday. [facility] after CT showed possible intracranial hypertension. Patient received; dilaudid, Reglan, benadryl, Zofran and tylenol at the clinic. Medics gave 50 fentanyl upon arrival. A&Ox4, patient feels most of the pain behind the eyes.

## 2023-09-13 ENCOUNTER — APPOINTMENT (OUTPATIENT)
Dept: GENERAL RADIOLOGY | Facility: CLINIC | Age: 33
End: 2023-09-13
Attending: HOSPITALIST
Payer: COMMERCIAL

## 2023-09-13 VITALS
TEMPERATURE: 98 F | BODY MASS INDEX: 21.69 KG/M2 | DIASTOLIC BLOOD PRESSURE: 86 MMHG | RESPIRATION RATE: 14 BRPM | WEIGHT: 135 LBS | SYSTOLIC BLOOD PRESSURE: 117 MMHG | OXYGEN SATURATION: 99 % | HEIGHT: 66 IN | HEART RATE: 74 BPM

## 2023-09-13 LAB
ANION GAP SERPL CALCULATED.3IONS-SCNC: 6 MMOL/L (ref 7–15)
APPEARANCE CSF: CLEAR
BUN SERPL-MCNC: 5.4 MG/DL (ref 6–20)
C GATTII+NEOFOR DNA CSF QL NAA+NON-PROBE: NEGATIVE
CALCIUM SERPL-MCNC: 8.5 MG/DL (ref 8.6–10)
CHLORIDE SERPL-SCNC: 110 MMOL/L (ref 98–107)
CMV DNA CSF QL NAA+NON-PROBE: NEGATIVE
COLOR CSF: COLORLESS
CREAT SERPL-MCNC: 0.63 MG/DL (ref 0.51–0.95)
DEPRECATED HCO3 PLAS-SCNC: 25 MMOL/L (ref 22–29)
E COLI K1 AG CSF QL: NEGATIVE
EGFRCR SERPLBLD CKD-EPI 2021: >90 ML/MIN/1.73M2
ERYTHROCYTE [DISTWIDTH] IN BLOOD BY AUTOMATED COUNT: 11.6 % (ref 10–15)
EV RNA SPEC QL NAA+PROBE: NEGATIVE
GLUCOSE CSF-MCNC: 57 MG/DL (ref 40–70)
GLUCOSE SERPL-MCNC: 98 MG/DL (ref 70–99)
GP B STREP DNA CSF QL NAA+NON-PROBE: NEGATIVE
HAEM INFLU DNA CSF QL NAA+NON-PROBE: NEGATIVE
HCT VFR BLD AUTO: 36.7 % (ref 35–47)
HGB BLD-MCNC: 12.2 G/DL (ref 11.7–15.7)
HHV6 DNA CSF QL NAA+NON-PROBE: NEGATIVE
HSV1 DNA CSF QL NAA+NON-PROBE: NEGATIVE
HSV2 DNA CSF QL NAA+NON-PROBE: NEGATIVE
L MONOCYTOG DNA CSF QL NAA+NON-PROBE: NEGATIVE
MAGNESIUM SERPL-MCNC: 1.9 MG/DL (ref 1.7–2.3)
MCH RBC QN AUTO: 30.4 PG (ref 26.5–33)
MCHC RBC AUTO-ENTMCNC: 33.2 G/DL (ref 31.5–36.5)
MCV RBC AUTO: 92 FL (ref 78–100)
N MEN DNA CSF QL NAA+NON-PROBE: NEGATIVE
PARECHOVIRUS A RNA CSF QL NAA+NON-PROBE: NEGATIVE
PHOSPHATE SERPL-MCNC: 2.4 MG/DL (ref 2.5–4.5)
PLATELET # BLD AUTO: 169 10E3/UL (ref 150–450)
POTASSIUM SERPL-SCNC: 4.3 MMOL/L (ref 3.4–5.3)
PROT CSF-MCNC: 30.7 MG/DL (ref 15–45)
RBC # BLD AUTO: 4.01 10E6/UL (ref 3.8–5.2)
RBC # CSF MANUAL: 1 /UL (ref 0–2)
S PNEUM DNA CSF QL NAA+NON-PROBE: NEGATIVE
SODIUM SERPL-SCNC: 141 MMOL/L (ref 136–145)
TUBE # CSF: 4
VZV DNA CSF QL NAA+NON-PROBE: NEGATIVE
WBC # BLD AUTO: 5 10E3/UL (ref 4–11)
WBC # CSF MANUAL: 0 /UL (ref 0–5)

## 2023-09-13 PROCEDURE — 86789 WEST NILE VIRUS ANTIBODY: CPT | Performed by: PSYCHIATRY & NEUROLOGY

## 2023-09-13 PROCEDURE — 258N000001 HC RX 258: Performed by: HOSPITALIST

## 2023-09-13 PROCEDURE — 82784 ASSAY IGA/IGD/IGG/IGM EACH: CPT | Performed by: PSYCHIATRY & NEUROLOGY

## 2023-09-13 PROCEDURE — 96376 TX/PRO/DX INJ SAME DRUG ADON: CPT | Mod: XU

## 2023-09-13 PROCEDURE — 89050 BODY FLUID CELL COUNT: CPT | Performed by: PSYCHIATRY & NEUROLOGY

## 2023-09-13 PROCEDURE — 87077 CULTURE AEROBIC IDENTIFY: CPT | Performed by: PSYCHIATRY & NEUROLOGY

## 2023-09-13 PROCEDURE — 250N000011 HC RX IP 250 OP 636: Mod: JZ | Performed by: INTERNAL MEDICINE

## 2023-09-13 PROCEDURE — 250N000013 HC RX MED GY IP 250 OP 250 PS 637: Performed by: HOSPITALIST

## 2023-09-13 PROCEDURE — G0378 HOSPITAL OBSERVATION PER HR: HCPCS

## 2023-09-13 PROCEDURE — 87483 CNS DNA AMP PROBE TYPE 12-25: CPT | Performed by: PSYCHIATRY & NEUROLOGY

## 2023-09-13 PROCEDURE — 84157 ASSAY OF PROTEIN OTHER: CPT | Performed by: PSYCHIATRY & NEUROLOGY

## 2023-09-13 PROCEDURE — 80048 BASIC METABOLIC PNL TOTAL CA: CPT | Performed by: HOSPITALIST

## 2023-09-13 PROCEDURE — 83735 ASSAY OF MAGNESIUM: CPT | Performed by: HOSPITALIST

## 2023-09-13 PROCEDURE — 85027 COMPLETE CBC AUTOMATED: CPT | Performed by: HOSPITALIST

## 2023-09-13 PROCEDURE — 87205 SMEAR GRAM STAIN: CPT | Performed by: PSYCHIATRY & NEUROLOGY

## 2023-09-13 PROCEDURE — 36415 COLL VENOUS BLD VENIPUNCTURE: CPT | Performed by: HOSPITALIST

## 2023-09-13 PROCEDURE — 84100 ASSAY OF PHOSPHORUS: CPT | Performed by: HOSPITALIST

## 2023-09-13 PROCEDURE — 86788 WEST NILE VIRUS AB IGM: CPT | Performed by: PSYCHIATRY & NEUROLOGY

## 2023-09-13 PROCEDURE — 87070 CULTURE OTHR SPECIMN AEROBIC: CPT | Performed by: PSYCHIATRY & NEUROLOGY

## 2023-09-13 PROCEDURE — 250N000009 HC RX 250: Performed by: INTERNAL MEDICINE

## 2023-09-13 PROCEDURE — 82945 GLUCOSE OTHER FLUID: CPT | Performed by: PSYCHIATRY & NEUROLOGY

## 2023-09-13 PROCEDURE — 250N000011 HC RX IP 250 OP 636: Performed by: HOSPITALIST

## 2023-09-13 PROCEDURE — 99239 HOSP IP/OBS DSCHRG MGMT >30: CPT | Performed by: PHYSICIAN ASSISTANT

## 2023-09-13 PROCEDURE — 62328 DX LMBR SPI PNXR W/FLUOR/CT: CPT

## 2023-09-13 RX ORDER — ONDANSETRON 4 MG/1
4 TABLET, ORALLY DISINTEGRATING ORAL EVERY 6 HOURS PRN
Qty: 20 TABLET | Refills: 0 | Status: SHIPPED | OUTPATIENT
Start: 2023-09-13

## 2023-09-13 RX ORDER — DIAZEPAM 5 MG
5 TABLET ORAL EVERY 6 HOURS PRN
Qty: 20 TABLET | Refills: 0 | Status: SHIPPED | OUTPATIENT
Start: 2023-09-13

## 2023-09-13 RX ORDER — ELETRIPTAN HYDROBROMIDE 20 MG/1
20 TABLET, FILM COATED ORAL
Qty: 5 TABLET | Refills: 1 | Status: SHIPPED | OUTPATIENT
Start: 2023-09-13

## 2023-09-13 RX ORDER — FLUOXETINE 10 MG/1
20 CAPSULE ORAL DAILY
Qty: 30 CAPSULE | Refills: 3 | Status: SHIPPED | OUTPATIENT
Start: 2023-09-14

## 2023-09-13 RX ADMIN — POTASSIUM CHLORIDE, DEXTROSE MONOHYDRATE AND SODIUM CHLORIDE: 150; 5; 900 INJECTION, SOLUTION INTRAVENOUS at 10:04

## 2023-09-13 RX ADMIN — DIAZEPAM 5 MG: 10 INJECTION, SOLUTION INTRAMUSCULAR; INTRAVENOUS at 08:50

## 2023-09-13 RX ADMIN — LIDOCAINE HYDROCHLORIDE 1 ML: 10 INJECTION, SOLUTION EPIDURAL; INFILTRATION; INTRACAUDAL; PERINEURAL at 09:29

## 2023-09-13 RX ADMIN — FLUOXETINE 10 MG: 10 CAPSULE ORAL at 08:42

## 2023-09-13 RX ADMIN — ONDANSETRON 4 MG: 2 INJECTION INTRAMUSCULAR; INTRAVENOUS at 08:51

## 2023-09-13 RX ADMIN — KETOROLAC TROMETHAMINE 30 MG: 15 INJECTION, SOLUTION INTRAMUSCULAR; INTRAVENOUS at 13:26

## 2023-09-13 ASSESSMENT — ACTIVITIES OF DAILY LIVING (ADL)
ADLS_ACUITY_SCORE: 35

## 2023-09-13 NOTE — PROCEDURES
[facility] Procedure Note          Lumbar Puncture:      Time: 9:00 AM  Performed by: Madi Lindo MD  Authorized by: Madi Lindo MD    Indications: headache, blurred vision    Consent given by: Patient who states understanding of the procedure being performed after discussing the risks, benefits and alternatives.    Prior to the start of the procedure and with procedural staff participation, I verbally confirmed the patient s identity using two indicators, relevant allergies, that the procedure was appropriate and matched the consent or emergent situation, and that the correct equipment/implants were available. Immediately prior to starting the procedure I conducted the Time Out with the procedural staff and re-confirmed the patient s name, procedure, and site/side. (The Joint Commission universal protocol was followed.) Yes    Under sterile conditions the patient was positioned prone. Betadine solution and sterile drapes were utilized.  Local anesthetic at the site: 2 ml of lidocaine 1% without epinephrine from the LP tray  Under fluoroscopic guidance, a 22 G 3.5 inch spinal needle was inserted at the L 3-L4 interspace.  Opening Pressure: 16 cm H2O pressure.  A total of 10.5 mL of clear and colorless spinal fluid was obtained and sent to the laboratory.   After the needle was removed, a bandaid and pressure were applied and the patient was instructed to stay horizontal until the results were back.    Complications:  None    Patient tolerance: Patient tolerated the procedure well with no immediate complications.

## 2023-09-13 NOTE — PLAN OF CARE
Goal Outcome Evaluation:    Pt here with HA nausea, & vomiting. A&Ox4. Neuros intact ex HA, and occasional blurry/double vision. VSS. Regular diet, thin liquids. Takes pills whole, prefers IV due to nausea. Independent. Complains of 8/10 HA pain, PRNs given per MAR, after intervention pt rates pain 7/10. Pt scoring green on the Aggression Stop Light Tool. Discharging with mother to home. All discharge instructions received. Education on medication and side effects given.

## 2023-09-13 NOTE — DISCHARGE SUMMARY
[facility]  Hospitalist Discharge Summary      Date of Admission:  9/11/2023  Date of Discharge:  9/13/2023  Discharging Provider: Carmelita Noonan PA-C  Discharge Service: Hospitalist Service    Discharge Diagnoses   Severe, intractable headache  S/p lumbar puncture  Mild hypophosphatemia, repleted    Clinically Significant Risk Factors          Follow-ups Needed After Discharge   Follow-up Appointments     Follow-up and recommended labs and tests       Follow up with primary care provider, Madi Alexis, within 1   month for hospital follow- up.  Titrate Prozac as needed. And follow up on   migraine management    Make an appointment with neuro ophthalmology on discharge.            Unresulted Labs Ordered in the Past 30 Days of this Admission       Date and Time Order Name Status Description    9/12/2023  5:05 PM Oligoclonal Banding: In process     9/12/2023  5:05 PM West Nile Virus IgG and IgM CSF: In process     9/12/2023  5:05 PM Oligoclonal banding CSF Tube 3 and Blood In process     9/12/2023  5:05 PM Cerebrospinal fluid Aerobic Bacterial Culture Routine Preliminary         These results will be followed up by Neurology    Discharge Disposition   Discharged to home  Condition at discharge: Stable    Hospital Course   Patricia Clement is a 33 year old female with PMH significant for history of migraines, MDD, celiacs disease who was admitted on 9/11/2023. She presents with intractable headache. For full HPI please see admission H&P from Dr. Boogie Aiken dated 9/12/23.     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 Took ibuprofen. Sunday woke with severe neck pain and could hardly move. Had changes in vision, lightheadedness and nausea. Head pounding behind eyes. Some photophobia, no other neuro changes. Note she has had diplopia requiring prism for the past  1-2 years. In ED VSS. Labs (at [facility]) normal.   *Head CT in the [facility] notes empty sella morphology incidentally noted. In the majority of patients, this is an asymptomatic incidental finding; however, and some patients this can be associated with idiopathic intracranial hypertension. If clinical concern for intracranial hypertension exists, correlation with ophthalmologic funduscopic exam for papilledema is advised.     She was sent to [facility]. MRI/ MRV normal. She was registered to observation. Hydrated with IV fluids given poor intake. Neuro checks monitored. Neurology consulted, she had a funduscopic exam and noted that blood vessels were normal, though could not see the disc very well to completely exclude papilledema. Neuro recommended LP with opening pressure to assess for idiopathic intracranial hypertension and meningitis (low suspicion), which was completed with normal opening pressure and all cytology and cultures, meningitis/encephalitis panel negative. Given Replax without much benefit. PRN valium, reglan, toradol, dilaudid, compazine, zofran available. Neuro recommended neuro-ophthalmologist on discharge per neuro recommendations, family already has ophthalmology set up and imaging sent over to them. She will follow up with Neurology as an outpatient. She felt Valium was the most helpful for her symptoms. HA continued but to a lesser intensity and the patient felt she was well enough to continue to recover at home. She was discharged with valium and Zofran and a prescription for Replax, unfortunately the replax requires a prior auth and patient didn't find it too helpful therefore she elected to defer it at this point.      MDD: Resume prozac 10mg (she had weaned herself off previously)     Celiac disease: No acute issues, gluten free diet.      Consultations This Hospital Stay   NEUROLOGY IP CONSULT  VASCULAR ACCESS ADULT IP CONSULT  CARE MANAGEMENT / SOCIAL WORK IP CONSULT    Code  Status   Prior    Time Spent on this Encounter   I, Carmelita Noonan PA-C, personally saw the patient today and spent greater than 30 minutes discharging this patient.       Carmelita Noonan PA-C  [facility]  [address] [place] [address]  Phone: [phone]  ______________________________________________________________________    Physical Exam   Vital Signs:           Resp: 14        Weight: 135 lbs 0 oz    General: Awake, alert, very pleasant woman who appears stated age. Looks comfortable sitting up in bed. No acute distress.  HEENT: Normocephalic, atraumatic. Extraocular movements intact. Notes photophobia.  Respiratory: Clear to auscultation bilaterally, no rales, wheezing, or rhonchi.  Cardiovascular: Regular rate and rhythm, +S1 and S2, no murmur auscultated. No peripheral edema.   Gastrointestinal: Soft, non-tender, non-distended. Bowel sounds present.  Skin: Warm, dry. No obvious rashes or lesions on exposed skin. Dorsalis pedis pulses palpable bilaterally.  Musculoskeletal: No joint swelling, erythema or tenderness. Moves all extremities equally.  Neurologic: AAO x3. Cranial nerves 2-12 grossly intact, normal strength and sensation.  Psychiatric: Appropriate mood and affect. No obvious anxiety or depression.         Primary Care Physician   Madi Alexis    Discharge Orders      Reason for your hospital stay    You were admitted with headache, photophobia, and feeling of pressure behind your eyes. MRI/MRV done along with LP. You saw neurology.     Follow-up and recommended labs and tests     Follow up with primary care provider, Madi Alexis, within 1 month for hospital follow- up.  Titrate Prozac as needed. And follow up on migraine management    Make an appointment with neuro ophthalmology on discharge.     Activity    Your activity upon discharge: activity as tolerated     Diet    Follow this diet upon discharge: Orders Placed This  Encounter      Gluten Free Diet       Significant Results and Procedures   Results for orders placed or performed during the hospital encounter of 09/11/23   MR Brain w/o & w Contrast    Narrative    EXAM: MR BRAIN W/O and W CONTRAST, MRV BRAIN  W/O and W CONTRAST  LOCATION: [facility]  DATE/TIME: 9/12/2023 1:10 AM CDT    INDICATION:  Headache  COMPARISON: None.  CONTRAST: 10 mL Gadavist  TECHNIQUE:  1) Multiplanar multisequence head MRI without and with intravenous contrast including dedicated imaging of the sella.  2) Phase contrast and 2-D time-of-flight head MRV performed with intravenous contrast.    FINDINGS:  BRAIN:  SELLA: Dedicated imaging of the sella demonstrates normal size, signal and enhancement characteristics of the pituitary gland.  No pituitary mass or hemorrhage. Normal pituitary stalk and suprasellar structures including the optic chiasm. Normal   cavernous sinuses.    INTRACRANIAL CONTENTS: No acute or subacute infarct. No mass, acute hemorrhage, or extra-axial fluid collections. Normal brain parenchymal signal. Normal ventricles and sulci. Normal position of the cerebellar tonsils. No pathologic contrast enhancement.    OTHER: Accounting for technique no additional abnormalities identified.    MRV:  DURAL SINUSES: No significant stenosis or occlusion. Dominant right and smaller left transverse and sigmoid dural sinuses.    INTERNAL CEREBRAL VEINS: No significant stenosis or occlusion.      MAJOR CORTICAL VENOUS BRANCHES: No significant stenosis or occlusion.      Impression    IMPRESSION:  HEAD MRI:   1.  Normal pituitary MRI.     HEAD MRV:  1.  No dural venous sinus thrombosis or significant stenosis.   MRV Brain wo & w Contrast    Narrative    EXAM: MR BRAIN W/O and W CONTRAST, MRV BRAIN  W/O and W CONTRAST  LOCATION: [facility]  DATE/TIME: 9/12/2023 1:10 AM CDT    INDICATION:  Headache  COMPARISON: None.  CONTRAST: 10 mL  Gadavist  TECHNIQUE:  1) Multiplanar multisequence head MRI without and with intravenous contrast including dedicated imaging of the sella.  2) Phase contrast and 2-D time-of-flight head MRV performed with intravenous contrast.    FINDINGS:  BRAIN:  SELLA: Dedicated imaging of the sella demonstrates normal size, signal and enhancement characteristics of the pituitary gland.  No pituitary mass or hemorrhage. Normal pituitary stalk and suprasellar structures including the optic chiasm. Normal   cavernous sinuses.    INTRACRANIAL CONTENTS: No acute or subacute infarct. No mass, acute hemorrhage, or extra-axial fluid collections. Normal brain parenchymal signal. Normal ventricles and sulci. Normal position of the cerebellar tonsils. No pathologic contrast enhancement.    OTHER: Accounting for technique no additional abnormalities identified.    MRV:  DURAL SINUSES: No significant stenosis or occlusion. Dominant right and smaller left transverse and sigmoid dural sinuses.    INTERNAL CEREBRAL VEINS: No significant stenosis or occlusion.      MAJOR CORTICAL VENOUS BRANCHES: No significant stenosis or occlusion.      Impression    IMPRESSION:  HEAD MRI:   1.  Normal pituitary MRI.     HEAD MRV:  1.  No dural venous sinus thrombosis or significant stenosis.   XR Lumbar Puncture Spinal Tap Diagnostic    Narrative    LUMBAR PUNCTURE WITH FLUOROSCOPIC GUIDANCE  9/13/2023 9:58 AM     HISTORY: Opening pressure measurement requested. Acute intractable  headache, unspecified headache type. Intracranial hypertension.    PROCEDURE:  Informed consent was obtained including discussion of  risks, benefits and potential complications of the procedure. The  patient was placed in prone position. The L3-L4 interspace was  identified and the overlying skin was marked. The patient was then  prepped, draped and anesthetized using sterile technique. Local  anesthesia was performed using 1% lidocaine.    A 22-gauge 3.5 inch spinal needle was  inserted under fluoroscopic  guidance into the CSF space with return of clear fluid. 10.5 mL of  clear cerebrospinal fluid was sent to the laboratory for analysis. The  stylet was reinserted and the spinal needle was removed. There were no  complications.     FLUOROSCOPY TIME: 0.1 minutes.     Opening pressure:  16 cm H2O.        Impression    IMPRESSION: Uncomplicated fluoroscopic-guided lumbar puncture.    PAMELLA BOSWELL MD         SYSTEM ID:  [id number]       Discharge Medications   Discharge Medication List as of 9/13/2023  3:31 PM        START taking these medications    Details   diazepam (VALIUM) 5 MG tablet Take 1 tablet (5 mg) by mouth every 6 hours as needed for anxiety, Disp-20 tablet, R-0, E-PrescribeFuture refills by PCP Dr. Pamella Alexis with phone number [phone].      eletriptan (RELPAX) 20 MG tablet Take 1 tablet (20 mg) by mouth at onset of headache for migraine May repeat in 2 hours. Max 4 tablets/24 hours., Disp-5 tablet, R-1, E-PrescribeFuture refills by PCP Dr. Pamella Alexis with phone number [phone].      FLUoxetine (PROZAC) 10 MG capsule Take 2 capsules (20 mg) by mouth daily, Disp-30 capsule, R-3, E-PrescribeFuture refills by PCP Dr. Pamella Alexis with phone number [phone].      ondansetron (ZOFRAN ODT) 4 MG ODT tab Take 1 tablet (4 mg) by mouth every 6 hours as needed for nausea or vomiting, Disp-20 tablet, R-0, E-PrescribeFuture refills by PCP Dr. Pamella Alexis with phone number [phone].           CONTINUE these medications which have NOT CHANGED    Details   acetaminophen (TYLENOL) 325 MG tablet Take 325-650 mg by mouth every 6 hours as needed for mild pain, Historical      ibuprofen (ADVIL/MOTRIN) 200 MG tablet Take 200 mg by mouth every 4 hours as needed for pain, Historical           Allergies   Allergies   Allergen Reactions    Sulfa Antibiotics Anaphylaxis

## 2023-09-13 NOTE — PROGRESS NOTES
[facility] [place]    Neurology Progress Note     Assessment & Plan   Patricia Clement is a 33 year old female who was admitted on 9/11/2023.  The patient admitted with exacerbation of migraine, questionable tension type headache.  So far lumbar puncture including opening pressure has been normal.  To my initial exam the patient has a potential, questionable left 6th nerve palsy.  However no compelling evidence for pseudotumor cerebri.    -The patient wants to go home today.  -Follow-up with neuro-ophthalmologist as an outpatient, the patient states that her mother is already trying to set follow-up appointment.  -The patient should follow-up with neurology for further treatment of her headaches, with a neurologist of her choice.    Neurology will sign off if there are any questions or concerns please call us.    Torie Marie MD    Interval History   The patient has had lumbar puncture, opening pressure is normal at 16 cm water.  CSF results have been normal so far, normal protein no white blood cells present normal glucose.    The patient is laying down in bed complaining of a headache.  Despite that she wants to go home.  She discontinued herself the Dilaudid and states that the only medication that has helped was Valium.  She does not want any pain medication.  She states that her headache is not positional and it is pretty much unchanged compared to prior to the lumbar puncture.    Physical Exam   Temp: 98  F (36.7  C) Temp src: Oral BP: 117/86 Pulse: 74   Resp: 14 SpO2: 99 % O2 Device: None (Room air)    Vitals:    09/11/23 2045   Weight: 61.2 kg (135 lb)     Vital Signs with Ranges  Temp:  [98  F (36.7  C)-98.4  F (36.9  C)] 98  F (36.7  C)  Pulse:  [70-74] 74  Resp:  [14] 14  BP: (117-119)/(80-86) 117/86  SpO2:  [95 %-99 %] 99 %  No intake/output data recorded.      Medications    dextrose 5% and 0.9% NaCl with potassium chloride 20 mEq 100 mL/hr at 09/13/23 1004      FLUoxetine   10 mg Oral Daily    ondansetron  4 mg Intravenous Once    sodium chloride (PF)  3 mL Intracatheter Q8H    sodium phosphate  9 mmol Intravenous Once       Data   Results for orders placed or performed during the hospital encounter of 09/11/23 (from the past 24 hour(s))   Oligoclonal banding CSF Tube 3 and Blood    Narrative    The following orders were created for panel order Oligoclonal banding CSF Tube 3 and Blood.  Procedure                               Abnormality         Status                     ---------                               -----------         ------                     Oligoclonal Banding:[[id number]]                             In process                 Serum Collection (Accomp...[[id number]]                      In process                   Please view results for these tests on the individual orders.   Basic metabolic panel   Result Value Ref Range    Sodium 141 136 - 145 mmol/L    Potassium 4.3 3.4 - 5.3 mmol/L    Chloride 110 (H) 98 - 107 mmol/L    Carbon Dioxide (CO2) 25 22 - 29 mmol/L    Anion Gap 6 (L) 7 - 15 mmol/L    Urea Nitrogen 5.4 (L) 6.0 - 20.0 mg/dL    Creatinine 0.63 0.51 - 0.95 mg/dL    Calcium 8.5 (L) 8.6 - 10.0 mg/dL    Glucose 98 70 - 99 mg/dL    GFR Estimate >90 >60 mL/min/1.73m2   CBC with platelets   Result Value Ref Range    WBC Count 5.0 4.0 - 11.0 10e3/uL    RBC Count 4.01 3.80 - 5.20 10e6/uL    Hemoglobin 12.2 11.7 - 15.7 g/dL    Hematocrit 36.7 35.0 - 47.0 %    MCV 92 78 - 100 fL    MCH 30.4 26.5 - 33.0 pg    MCHC 33.2 31.5 - 36.5 g/dL    RDW 11.6 10.0 - 15.0 %    Platelet Count 169 150 - 450 10e3/uL   Magnesium   Result Value Ref Range    Magnesium 1.9 1.7 - 2.3 mg/dL   Phosphorus   Result Value Ref Range    Phosphorus 2.4 (L) 2.5 - 4.5 mg/dL   Glucose CSF:   Result Value Ref Range    Glucose CSF 57 40 - 70 mg/dL    Narrative    CSF glucose concentrations are about 60 percent of normal plasma glucose.   Protein total CSF:   Result Value Ref Range    Protein total CSF  30.7 15.0 - 45.0 mg/dL   CSF Cell Count with Differential:    Narrative    The following orders were created for panel order CSF Cell Count with Differential:.  Procedure                               Abnormality         Status                     ---------                               -----------         ------                     Cell Count CSF[[id number]]                                   Final result                 Please view results for these tests on the individual orders.   Cerebrospinal fluid Aerobic Bacterial Culture Routine    Specimen: Lumbar Puncture; Cerebrospinal fluid   Result Value Ref Range    Gram Stain Result No organisms seen    Cell Count CSF   Result Value Ref Range    Tube Number 4     Color Colorless Colorless    Clarity Clear Clear    Total Nucleated Cells 0 0 - 5 /uL    RBC Count 1 0 - 2 /uL   XR Lumbar Puncture Spinal Tap Diagnostic    Narrative    LUMBAR PUNCTURE WITH FLUOROSCOPIC GUIDANCE  9/13/2023 9:58 AM     HISTORY: Opening pressure measurement requested. Acute intractable  headache, unspecified headache type. Intracranial hypertension.    PROCEDURE:  Informed consent was obtained including discussion of  risks, benefits and potential complications of the procedure. The  patient was placed in prone position. The L3-L4 interspace was  identified and the overlying skin was marked. The patient was then  prepped, draped and anesthetized using sterile technique. Local  anesthesia was performed using 1% lidocaine.    A 22-gauge 3.5 inch spinal needle was inserted under fluoroscopic  guidance into the CSF space with return of clear fluid. 10.5 mL of  clear cerebrospinal fluid was sent to the laboratory for analysis. The  stylet was reinserted and the spinal needle was removed. There were no  complications.     FLUOROSCOPY TIME: 0.1 minutes.     Opening pressure:  16 cm H2O.        Impression    IMPRESSION: Uncomplicated fluoroscopic-guided lumbar puncture.    PAMELLA BOSWELL MD          SYSTEM ID:  [id number]

## 2023-09-13 NOTE — PLAN OF CARE
Pt here with Headache, nausea and vomiting. A&Ox4. Neuros are intact ex intermittent blurred vision and double vision. VSS. Regular diet, thin liquids. Takes pills whole. D5% with Kcl and NS infusing at 100 ml/hr. Up with IND in room. PRN Valium, Oxycodone and RELPAX given. Pt scoring green on the Aggression Stop Light Tool. Plan is for LP today. Discharge pending.

## 2023-09-13 NOTE — CONSULTS
CM consult to fax specific chart documents to an outside provider.  [facility] will fax these records and was notified by charge RN to do this.

## 2023-09-15 ENCOUNTER — PATIENT OUTREACH (OUTPATIENT)
Dept: CARE COORDINATION | Facility: CLINIC | Age: 33
End: 2023-09-15
Payer: COMMERCIAL

## 2023-09-15 LAB
ALB CSF/SERPL: 7 RATIO
ALBUMIN CSF-MCNC: 26 MG/DL
ALBUMIN SERPL-MCNC: 3717 MG/DL
IGG CSF-MCNC: 3.1 MG/DL
IGG SERPL-MCNC: 587 MG/DL
IGG SYNTH RATE SER+CSF CALC-MRATE: 4.2 MG/D
IGG/ALB CLEAR SER+CSF-RTO: 0.75 RATIO
IGG/ALB CSF: 0.12 RATIO
OLIGOCLONAL BANDS CSF ELPH-IMP: ABNORMAL
OLIGOCLONAL BANDS CSF ELPH-IMP: NEGATIVE
OLIGOCLONAL BANDS CSF IEF: 0 BANDS
WNV IGG CSF IA-ACNC: 0.05 IV
WNV IGM CSF IA-ACNC: 0 IV

## 2023-09-15 NOTE — PROGRESS NOTES
[facility] Contact  [facility]/Voicemail     Clinical Data: Post-Discharge Outreach     Outreach attempted x 2.  Left message on patient's voicemail, providing [facility]'s 24/7 scheduling and nurse triage phone number [phone] ([phone]) for questions/concerns and/or to schedule an appt with an [facility] provider, if they do not have a PCP.      Plan:  [facility] will do no further outreaches at this time.       Rebecca Silveira  Community Health Worker  [facility], [facility]  [phone]      *Connected Care Resource Team does NOT follow patient ongoing. Referrals are identified based on internal discharge reports and the outreach is to ensure patient has an understanding of their discharge instructions.

## 2023-09-17 NOTE — PROGRESS NOTES
Brief hospitalist follow up note      As hospitalist captain during the day today I was notified of CSF culture being positive for gram positive bacilli in broth only on day 4.  Reviewed CSF results which show 0 total nucleated cells.  Discussed the case with on call infectious disease physician.  Attempted to speak with patient on phone but no answer.  Will call again Monday 9/18. Not felt to be an urgent concern as culture likely contaminant.       ADDENDUM - 9/18/23  Called patient on listed number - no answer. Left voicemail and recommended that if she is having symptoms or fevers etc, she should be evaluated by her PCP or UC or ED.

## 2023-09-19 LAB
BACTERIA CSF CULT: ABNORMAL
GRAM STAIN RESULT: ABNORMAL
GRAM STAIN RESULT: ABNORMAL

## 2023-10-22 ENCOUNTER — HEALTH MAINTENANCE LETTER (OUTPATIENT)
Age: 33
End: 2023-10-22

## 2024-12-15 ENCOUNTER — HEALTH MAINTENANCE LETTER (OUTPATIENT)
Age: 34
End: 2024-12-15

## (undated) RX ORDER — LIDOCAINE HYDROCHLORIDE 10 MG/ML
INJECTION, SOLUTION EPIDURAL; INFILTRATION; INTRACAUDAL; PERINEURAL
Status: DISPENSED
Start: 2023-09-13